# Patient Record
Sex: FEMALE | Race: WHITE | Employment: OTHER | URBAN - METROPOLITAN AREA
[De-identification: names, ages, dates, MRNs, and addresses within clinical notes are randomized per-mention and may not be internally consistent; named-entity substitution may affect disease eponyms.]

---

## 2019-04-05 ENCOUNTER — APPOINTMENT (OUTPATIENT)
Dept: CT IMAGING | Age: 84
End: 2019-04-05
Payer: MEDICARE

## 2019-04-05 ENCOUNTER — HOSPITAL ENCOUNTER (OUTPATIENT)
Age: 84
Setting detail: OBSERVATION
Discharge: NURSING, REHAB OR LONG-TERM CARE | End: 2019-04-07
Attending: EMERGENCY MEDICINE | Admitting: INTERNAL MEDICINE
Payer: MEDICARE

## 2019-04-05 ENCOUNTER — APPOINTMENT (OUTPATIENT)
Dept: GENERAL RADIOLOGY | Age: 84
End: 2019-04-05
Payer: MEDICARE

## 2019-04-05 DIAGNOSIS — G45.9 TIA (TRANSIENT ISCHEMIC ATTACK): Primary | ICD-10-CM

## 2019-04-05 LAB
A/G RATIO: 1 (ref 1.1–2.2)
ALBUMIN SERPL-MCNC: 3.8 G/DL (ref 3.4–5)
ALP BLD-CCNC: 177 U/L (ref 40–129)
ALT SERPL-CCNC: 13 U/L (ref 10–40)
ANION GAP SERPL CALCULATED.3IONS-SCNC: 13 MMOL/L (ref 3–16)
AST SERPL-CCNC: 22 U/L (ref 15–37)
BASOPHILS ABSOLUTE: 0.2 K/UL (ref 0–0.2)
BASOPHILS RELATIVE PERCENT: 3 %
BILIRUB SERPL-MCNC: <0.2 MG/DL (ref 0–1)
BUN BLDV-MCNC: 25 MG/DL (ref 7–20)
CALCIUM SERPL-MCNC: 9.3 MG/DL (ref 8.3–10.6)
CHLORIDE BLD-SCNC: 105 MMOL/L (ref 99–110)
CO2: 24 MMOL/L (ref 21–32)
CREAT SERPL-MCNC: 0.9 MG/DL (ref 0.6–1.2)
EOSINOPHILS ABSOLUTE: 0.3 K/UL (ref 0–0.6)
EOSINOPHILS RELATIVE PERCENT: 3.3 %
GFR AFRICAN AMERICAN: >60
GFR NON-AFRICAN AMERICAN: 59
GLOBULIN: 3.8 G/DL
GLUCOSE BLD-MCNC: 91 MG/DL (ref 70–99)
HCT VFR BLD CALC: 43 % (ref 36–48)
HEMOGLOBIN: 14 G/DL (ref 12–16)
INR BLD: 1.14 (ref 0.86–1.14)
LYMPHOCYTES ABSOLUTE: 2.1 K/UL (ref 1–5.1)
LYMPHOCYTES RELATIVE PERCENT: 27.3 %
MCH RBC QN AUTO: 28 PG (ref 26–34)
MCHC RBC AUTO-ENTMCNC: 32.6 G/DL (ref 31–36)
MCV RBC AUTO: 85.8 FL (ref 80–100)
MONOCYTES ABSOLUTE: 0.6 K/UL (ref 0–1.3)
MONOCYTES RELATIVE PERCENT: 8.1 %
NEUTROPHILS ABSOLUTE: 4.6 K/UL (ref 1.7–7.7)
NEUTROPHILS RELATIVE PERCENT: 58.3 %
PDW BLD-RTO: 16.1 % (ref 12.4–15.4)
PLATELET # BLD: 387 K/UL (ref 135–450)
PMV BLD AUTO: 8.2 FL (ref 5–10.5)
POTASSIUM SERPL-SCNC: 5.1 MMOL/L (ref 3.5–5.1)
PRO-BNP: 327 PG/ML (ref 0–449)
PROTHROMBIN TIME: 13 SEC (ref 9.8–13)
RBC # BLD: 5.01 M/UL (ref 4–5.2)
SODIUM BLD-SCNC: 142 MMOL/L (ref 136–145)
TOTAL PROTEIN: 7.6 G/DL (ref 6.4–8.2)
TROPONIN: <0.01 NG/ML
WBC # BLD: 7.9 K/UL (ref 4–11)

## 2019-04-05 PROCEDURE — 80053 COMPREHEN METABOLIC PANEL: CPT

## 2019-04-05 PROCEDURE — 6360000004 HC RX CONTRAST MEDICATION: Performed by: EMERGENCY MEDICINE

## 2019-04-05 PROCEDURE — 70498 CT ANGIOGRAPHY NECK: CPT

## 2019-04-05 PROCEDURE — 93005 ELECTROCARDIOGRAM TRACING: CPT | Performed by: EMERGENCY MEDICINE

## 2019-04-05 PROCEDURE — 85610 PROTHROMBIN TIME: CPT

## 2019-04-05 PROCEDURE — 71045 X-RAY EXAM CHEST 1 VIEW: CPT

## 2019-04-05 PROCEDURE — G0378 HOSPITAL OBSERVATION PER HR: HCPCS

## 2019-04-05 PROCEDURE — 83880 ASSAY OF NATRIURETIC PEPTIDE: CPT

## 2019-04-05 PROCEDURE — 99285 EMERGENCY DEPT VISIT HI MDM: CPT

## 2019-04-05 PROCEDURE — 70496 CT ANGIOGRAPHY HEAD: CPT

## 2019-04-05 PROCEDURE — 70450 CT HEAD/BRAIN W/O DYE: CPT

## 2019-04-05 PROCEDURE — 85025 COMPLETE CBC W/AUTO DIFF WBC: CPT

## 2019-04-05 PROCEDURE — 84484 ASSAY OF TROPONIN QUANT: CPT

## 2019-04-05 RX ORDER — LOPERAMIDE HYDROCHLORIDE 2 MG/1
2 CAPSULE ORAL 4 TIMES DAILY PRN
COMMUNITY

## 2019-04-05 RX ORDER — M-VIT,TX,IRON,MINS/CALC/FOLIC 27MG-0.4MG
1 TABLET ORAL DAILY
Status: DISCONTINUED | OUTPATIENT
Start: 2019-04-06 | End: 2019-04-07 | Stop reason: HOSPADM

## 2019-04-05 RX ORDER — SODIUM CHLORIDE 0.9 % (FLUSH) 0.9 %
10 SYRINGE (ML) INJECTION PRN
Status: DISCONTINUED | OUTPATIENT
Start: 2019-04-05 | End: 2019-04-07 | Stop reason: HOSPADM

## 2019-04-05 RX ORDER — BISACODYL 10 MG
10 SUPPOSITORY, RECTAL RECTAL DAILY
Status: DISCONTINUED | OUTPATIENT
Start: 2019-04-06 | End: 2019-04-07 | Stop reason: HOSPADM

## 2019-04-05 RX ORDER — LANOLIN ALCOHOL/MO/W.PET/CERES
3 CREAM (GRAM) TOPICAL NIGHTLY
Status: DISCONTINUED | OUTPATIENT
Start: 2019-04-05 | End: 2019-04-07 | Stop reason: HOSPADM

## 2019-04-05 RX ORDER — FLUTICASONE PROPIONATE 50 MCG
2 SPRAY, SUSPENSION (ML) NASAL DAILY
Status: DISCONTINUED | OUTPATIENT
Start: 2019-04-06 | End: 2019-04-07 | Stop reason: HOSPADM

## 2019-04-05 RX ORDER — ONDANSETRON 2 MG/ML
4 INJECTION INTRAMUSCULAR; INTRAVENOUS EVERY 6 HOURS PRN
Status: DISCONTINUED | OUTPATIENT
Start: 2019-04-05 | End: 2019-04-07 | Stop reason: HOSPADM

## 2019-04-05 RX ORDER — LOPERAMIDE HYDROCHLORIDE 2 MG/1
2 CAPSULE ORAL 4 TIMES DAILY PRN
Status: DISCONTINUED | OUTPATIENT
Start: 2019-04-05 | End: 2019-04-07 | Stop reason: HOSPADM

## 2019-04-05 RX ORDER — ACETAMINOPHEN 325 MG/1
650 TABLET ORAL EVERY 4 HOURS PRN
Status: DISCONTINUED | OUTPATIENT
Start: 2019-04-05 | End: 2019-04-07 | Stop reason: HOSPADM

## 2019-04-05 RX ORDER — ATORVASTATIN CALCIUM 40 MG/1
40 TABLET, FILM COATED ORAL NIGHTLY
Status: DISCONTINUED | OUTPATIENT
Start: 2019-04-05 | End: 2019-04-05 | Stop reason: ALTCHOICE

## 2019-04-05 RX ORDER — ACETAMINOPHEN 500 MG
1000 TABLET ORAL EVERY 6 HOURS PRN
Status: DISCONTINUED | OUTPATIENT
Start: 2019-04-05 | End: 2019-04-07 | Stop reason: HOSPADM

## 2019-04-05 RX ORDER — BISACODYL 10 MG
10 SUPPOSITORY, RECTAL RECTAL DAILY
COMMUNITY

## 2019-04-05 RX ORDER — POLYETHYLENE GLYCOL 3350 17 G/17G
17 POWDER, FOR SOLUTION ORAL DAILY PRN
Status: DISCONTINUED | OUTPATIENT
Start: 2019-04-05 | End: 2019-04-07 | Stop reason: HOSPADM

## 2019-04-05 RX ORDER — POLYETHYLENE GLYCOL 3350 17 G/17G
17 POWDER, FOR SOLUTION ORAL DAILY
Status: DISCONTINUED | OUTPATIENT
Start: 2019-04-06 | End: 2019-04-07 | Stop reason: HOSPADM

## 2019-04-05 RX ORDER — ATORVASTATIN CALCIUM 10 MG/1
10 TABLET, FILM COATED ORAL NIGHTLY
Status: DISCONTINUED | OUTPATIENT
Start: 2019-04-05 | End: 2019-04-07 | Stop reason: HOSPADM

## 2019-04-05 RX ORDER — MIRTAZAPINE 15 MG/1
7.5 TABLET, FILM COATED ORAL NIGHTLY
COMMUNITY

## 2019-04-05 RX ORDER — ATORVASTATIN CALCIUM 10 MG/1
10 TABLET, FILM COATED ORAL DAILY
COMMUNITY

## 2019-04-05 RX ORDER — LABETALOL HYDROCHLORIDE 5 MG/ML
10 INJECTION, SOLUTION INTRAVENOUS EVERY 10 MIN PRN
Status: DISCONTINUED | OUTPATIENT
Start: 2019-04-05 | End: 2019-04-07 | Stop reason: HOSPADM

## 2019-04-05 RX ORDER — POLYETHYLENE GLYCOL 3350 17 G/17G
17 POWDER, FOR SOLUTION ORAL DAILY
COMMUNITY

## 2019-04-05 RX ORDER — MIRTAZAPINE 15 MG/1
7.5 TABLET, FILM COATED ORAL NIGHTLY
Status: DISCONTINUED | OUTPATIENT
Start: 2019-04-05 | End: 2019-04-07 | Stop reason: HOSPADM

## 2019-04-05 RX ORDER — SODIUM CHLORIDE 0.9 % (FLUSH) 0.9 %
10 SYRINGE (ML) INJECTION EVERY 12 HOURS SCHEDULED
Status: DISCONTINUED | OUTPATIENT
Start: 2019-04-05 | End: 2019-04-07 | Stop reason: HOSPADM

## 2019-04-05 RX ORDER — ASPIRIN 81 MG/1
81 TABLET ORAL DAILY
Status: DISCONTINUED | OUTPATIENT
Start: 2019-04-06 | End: 2019-04-07 | Stop reason: HOSPADM

## 2019-04-05 RX ORDER — SENNA AND DOCUSATE SODIUM 50; 8.6 MG/1; MG/1
1 TABLET, FILM COATED ORAL DAILY
Status: DISCONTINUED | OUTPATIENT
Start: 2019-04-06 | End: 2019-04-07 | Stop reason: HOSPADM

## 2019-04-05 RX ORDER — SENNA AND DOCUSATE SODIUM 50; 8.6 MG/1; MG/1
1 TABLET, FILM COATED ORAL DAILY
COMMUNITY

## 2019-04-05 RX ADMIN — IOPAMIDOL 75 ML: 755 INJECTION, SOLUTION INTRAVENOUS at 19:13

## 2019-04-05 ASSESSMENT — PAIN SCALES - GENERAL: PAINLEVEL_OUTOF10: 0

## 2019-04-05 NOTE — ED PROVIDER NOTES
[facility] Emergency Department    CHIEF COMPLAINT  Chief Complaint   Patient presents with    Fatigue     Patient arrived via squad for c/o stroke like symptoms. HISTORY OF PRESENT ILLNESS  Aayush Elliott is a 80 y.o. female  who presents to the ED complaining of concern for LKW at 5pm with facial droop and L sided weakness. EMS states daughter had pt sent in for evaluation but we cannot otherwise confirm the LKW time - calls to nursing home later indicate that the nurses at the nursing home [facility]) prompted the transfer and not the family. She has a history of Alzheimer's disease, and documented history of intracerebral hemorrhage, though history details from this are not available at this time. Patient states to me \"I want my father. [address] name. I'm only here because they got stuck. \"  She is not a reliable historian at baseline but does make eye contact. On arrival with EMS I did notice a subtle facial droop and L sided weakness. After her stroke-alert CT, I evaluated her with a full NIHSS (see charted documentation). Sx seemed to have improved. No other complaints, modifying factors or associated symptoms. I have reviewed the following from the nursing documentation. Past Medical History:   Diagnosis Date    Alzheimer disease     IBS (irritable bowel syndrome)     Urinary incontinence      History reviewed. No pertinent surgical history. History reviewed. No pertinent family history. Social History     Socioeconomic History    Marital status:       Spouse name: Not on file    Number of children: Not on file    Years of education: Not on file    Highest education level: Not on file   Occupational History    Not on file   Social Needs    Financial resource strain: Not on file    Food insecurity:     Worry: Not on file     Inability: Not on file    Transportation needs:     Medical: Not on file     Non-medical: Not on file   Tobacco Use    Smoking status: Never Smoker   Substance and Sexual Activity    Alcohol use: No    Drug use: No    Sexual activity: Not on file   Lifestyle    Physical activity:     Days per week: Not on file     Minutes per session: Not on file    Stress: Not on file   Relationships    Social connections:     Talks on phone: Not on file     Gets together: Not on file     Attends Restorationist service: Not on file     Active member of club or organization: Not on file     Attends meetings of clubs or organizations: Not on file     Relationship status: Not on file    Intimate partner violence:     Fear of current or ex partner: Not on file     Emotionally abused: Not on file     Physically abused: Not on file     Forced sexual activity: Not on file   Other Topics Concern    Not on file   Social History Narrative    Not on file     No current facility-administered medications for this encounter. Current Outpatient Medications   Medication Sig Dispense Refill    atorvastatin (LIPITOR) 10 MG tablet Take 10 mg by mouth daily      tretinoin (RETIN-A) 0.025 % cream Apply topically nightly Apply topically nightly.  loperamide (IMODIUM) 2 MG capsule Take 2 mg by mouth 4 times daily as needed for Diarrhea      bisacodyl (DULCOLAX) 10 MG suppository Place 10 mg rectally daily      sennosides-docusate sodium (SENOKOT-S) 8.6-50 MG tablet Take 1 tablet by mouth daily      polyethylene glycol (MIRALAX) powder Take 17 g by mouth daily      mirtazapine (REMERON) 15 MG tablet Take 7.5 mg by mouth nightly      fluticasone (FLONASE) 50 MCG/ACT nasal spray 2 sprays by Nasal route daily.  therapeutic multivitamin-minerals (THERAGRAN-M) tablet Take 1 tablet by mouth daily.  melatonin 3 MG TABS Take 3 mg by mouth nightly.  acetaminophen (TYLENOL) 500 MG tablet Take 1,000 mg by mouth every 6 hours as needed.  aspirin 81 MG tablet Take 81 mg by mouth daily.       Calcium Carbonate-Vitamin D (CALCIUM 600 + D PO) Take hospital encounter of 04/05/19   CBC auto differential   Result Value Ref Range    WBC 7.9 4.0 - 11.0 K/uL    RBC 5.01 4.00 - 5.20 M/uL    Hemoglobin 14.0 12.0 - 16.0 g/dL    Hematocrit 43.0 36.0 - 48.0 %    MCV 85.8 80.0 - 100.0 fL    MCH 28.0 26.0 - 34.0 pg    MCHC 32.6 31.0 - 36.0 g/dL    RDW 16.1 (H) 12.4 - 15.4 %    Platelets 115 428 - 972 K/uL    MPV 8.2 5.0 - 10.5 fL    Neutrophils % 58.3 %    Lymphocytes % 27.3 %    Monocytes % 8.1 %    Eosinophils % 3.3 %    Basophils % 3.0 %    Neutrophils # 4.6 1.7 - 7.7 K/uL    Lymphocytes # 2.1 1.0 - 5.1 K/uL    Monocytes # 0.6 0.0 - 1.3 K/uL    Eosinophils # 0.3 0.0 - 0.6 K/uL    Basophils # 0.2 0.0 - 0.2 K/uL   Comprehensive metabolic panel   Result Value Ref Range    Sodium 142 136 - 145 mmol/L    Potassium 5.1 3.5 - 5.1 mmol/L    Chloride 105 99 - 110 mmol/L    CO2 24 21 - 32 mmol/L    Anion Gap 13 3 - 16    Glucose 91 70 - 99 mg/dL    BUN 25 (H) 7 - 20 mg/dL    CREATININE 0.9 0.6 - 1.2 mg/dL    GFR Non- 59 (A) >60    GFR African American >60 >60    Calcium 9.3 8.3 - 10.6 mg/dL    Total Protein 7.6 6.4 - 8.2 g/dL    Alb 3.8 3.4 - 5.0 g/dL    Albumin/Globulin Ratio 1.0 (L) 1.1 - 2.2    Total Bilirubin <0.2 0.0 - 1.0 mg/dL    Alkaline Phosphatase 177 (H) 40 - 129 U/L    ALT 13 10 - 40 U/L    AST 22 15 - 37 U/L    Globulin 3.8 g/dL   Protime-INR   Result Value Ref Range    Protime 13.0 9.8 - 13.0 sec    INR 1.14 0.86 - 1.14   Troponin   Result Value Ref Range    Troponin <0.01 <0.01 ng/mL   Brain Natriuretic Peptide   Result Value Ref Range    Pro- 0 - 449 pg/mL   EKG 12 Lead   Result Value Ref Range    Ventricular Rate 91 BPM    Atrial Rate 91 BPM    P-R Interval 178 ms    QRS Duration 80 ms    Q-T Interval 370 ms    QTc Calculation (Bazett) 455 ms    P Axis 48 degrees    R Axis -20 degrees    T Axis 47 degrees    Diagnosis Normal sinus rhythmNormal ECG      The 12 lead EKG was interpreted by me as follows:  Rate: normal with a rate of THE CHEST 4/5/2019 7:06 pm COMPARISON: None. HISTORY: ORDERING SYSTEM PROVIDED HISTORY: stroke alert TECHNOLOGIST PROVIDED HISTORY: Reason for exam:->stroke alert Ordering Physician Provided Reason for Exam: stroke alert Acuity: Unknown Type of Exam: Unknown FINDINGS: Heart, mediastinum and pulmonary vascularity are normal.  Interstitial changes and calcified granulomata are observed in the right greater than left lung. No acute airspace abnormality. No significant skeletal findings. Chronic interstitial changes in the chest with no acute finding. Cta Neck W Contrast    Result Date: 4/5/2019  EXAMINATION: CTA OF THE NECK; CTA OF THE HEAD WITH CONTRAST 4/5/2019 7:06 pm; 4/5/2019 7:05 pm: TECHNIQUE: CTA of the neck was performed with the administration of intravenous contrast. Multiplanar reformatted images are provided for review. MIP images are provided for review. Stenosis of the internal carotid arteries measured using NASCET criteria. Dose modulation, iterative reconstruction, and/or weight based adjustment of the mA/kV was utilized to reduce the radiation dose to as low as reasonably achievable.; CTA of the head/brain was performed with the administration of intravenous contrast. Multiplanar reformatted images are provided for review. MIP images are provided for review. Dose modulation, iterative reconstruction, and/or weight based adjustment of the mA/kV was utilized to reduce the radiation dose to as low as reasonably achievable. COMPARISON: None. HISTORY: ORDERING SYSTEM PROVIDED HISTORY: cva alert TECHNOLOGIST PROVIDED HISTORY: Has a \"code stroke\" or \"stroke alert\" been called? ->No Ordering Physician Provided Reason for Exam: stroke alert Acuity: Acute Type of Exam: Initial; ORDERING SYSTEM PROVIDED HISTORY: cva alert TECHNOLOGIST PROVIDED HISTORY: Has a \"code stroke\" or \"stroke alert\" been called? ->No Ordering Physician Provided Reason for Exam: stoke alert Acuity: Acute Type of Exam: Initial FINDINGS: CTA NECK: AORTIC ARCH/ARCH VESSELS: The aortic arch has been excluded from CAROTID ARTERIES: The common carotid arteries are normal in appearance without evidence of a flow limiting stenosis. There is 50% stenosis of the proximal left cervical ICA due to calcified and noncalcified plaque. No flow limiting stenosis of the contralateral carotid is identified. No dissection or arterial injury is seen. VERTEBRAL ARTERIES: The vertebral arteries both arise from the subclavian arteries and are normal in caliber without evidence of flow limiting stenosis or dissection. SOFT TISSUES: The lung apices are clear. No cervical or superior mediastinal lymphadenopathy. The visualized portion of the larynx and pharynx appear unremarkable. The parotid, submandibular and thyroid glands demonstrate no acute abnormality. BONES: There are degenerative changes of the spine. CTA HEAD: ANTERIOR CIRCULATION: The internal carotid arteries are normal in course and caliber without focal stenosis. The anterior cerebral and middle cerebral arteries demonstrate no focal stenosis. POSTERIOR CIRCULATION: The posterior cerebral arteries demonstrate no focal stenosis. The vertebral and basilar arteries appear unremarkable. BRAIN: See separately dictated noncontrast head CT report. 50% stenosis of the proximal left cervical ICA. Otherwise, no flow limiting stenosis or branch occlusion detected within the head or neck. Cta Head W Contrast    Result Date: 4/5/2019  EXAMINATION: CTA OF THE NECK; CTA OF THE HEAD WITH CONTRAST 4/5/2019 7:06 pm; 4/5/2019 7:05 pm: TECHNIQUE: CTA of the neck was performed with the administration of intravenous contrast. Multiplanar reformatted images are provided for review. MIP images are provided for review. Stenosis of the internal carotid arteries measured using NASCET criteria.  Dose modulation, iterative reconstruction, and/or weight based adjustment of the mA/kV was utilized to reduce the radiation dose to as low as reasonably achievable.; CTA of the head/brain was performed with the administration of intravenous contrast. Multiplanar reformatted images are provided for review. MIP images are provided for review. Dose modulation, iterative reconstruction, and/or weight based adjustment of the mA/kV was utilized to reduce the radiation dose to as low as reasonably achievable. COMPARISON: None. HISTORY: ORDERING SYSTEM PROVIDED HISTORY: cva alert TECHNOLOGIST PROVIDED HISTORY: Has a \"code stroke\" or \"stroke alert\" been called? ->No Ordering Physician Provided Reason for Exam: stroke alert Acuity: Acute Type of Exam: Initial; ORDERING SYSTEM PROVIDED HISTORY: cva alert TECHNOLOGIST PROVIDED HISTORY: Has a \"code stroke\" or \"stroke alert\" been called? ->No Ordering Physician Provided Reason for Exam: stoke alert Acuity: Acute Type of Exam: Initial FINDINGS: CTA NECK: AORTIC ARCH/ARCH VESSELS: The aortic arch has been excluded from CAROTID ARTERIES: The common carotid arteries are normal in appearance without evidence of a flow limiting stenosis. There is 50% stenosis of the proximal left cervical ICA due to calcified and noncalcified plaque. No flow limiting stenosis of the contralateral carotid is identified. No dissection or arterial injury is seen. VERTEBRAL ARTERIES: The vertebral arteries both arise from the subclavian arteries and are normal in caliber without evidence of flow limiting stenosis or dissection. SOFT TISSUES: The lung apices are clear. No cervical or superior mediastinal lymphadenopathy. The visualized portion of the larynx and pharynx appear unremarkable. The parotid, submandibular and thyroid glands demonstrate no acute abnormality. BONES: There are degenerative changes of the spine. CTA HEAD: ANTERIOR CIRCULATION: The internal carotid arteries are normal in course and caliber without focal stenosis.  The anterior cerebral and middle cerebral arteries demonstrate no focal stenosis. POSTERIOR CIRCULATION: The posterior cerebral arteries demonstrate no focal stenosis. The vertebral and basilar arteries appear unremarkable. BRAIN: See separately dictated noncontrast head CT report. 50% stenosis of the proximal left cervical ICA. Otherwise, no flow limiting stenosis or branch occlusion detected within the head or neck. ED COURSE/MDM  Patient seen and evaluated. Old records reviewed. Labs and imaging reviewed and results discussed with patient. After initial evaluation, differential diagnostic considerations included: stroke, TIA, intracranial bleed, trauma, infection/sepsis, medication side effect, intoxication/withdrawal, metabolic/electrolyte abnormalities    The patient's ED workup was notable for concern for transient ischemic attack. The patient has a nonfocal exam now but did have a facial droop upon arrival by EMS, no longer present when I evaluated her immediately post CAT scan. She has no appreciable deficits in her upper extremities. She has weakness in both of the legs but it appears symmetric. Her history is unreliable. She has a history of significant dementia. Head CT and CT angiogram did not demonstrate any acute large vessel occlusion or acute ischemic change or hemorrhagic problems either. Basic labs are unremarkable. At this point there is no indication for TPA. Unfortunately family members are not at the bedside and unable to be reached by phone at this point and so we will admit to the hospital for further treatment and evaluation of her likely transient ischemic event. I consulted and spoke with Dr. Stanford Hardwick from [facility] stroke team about the patient's ED history, physical, workup, and course so far. Recommendations from this consultant included agreement with no tPA.     During the patient's ED course, the patient was given:  Medications   iopamidol (ISOVUE-370) 76 % injection 75 mL (75 mLs Intravenous Given 4/5/19 1913)        CLINICAL IMPRESSION  1. TIA (transient ischemic attack)        Blood pressure (!) 150/83, pulse 91, temperature 98.1 °F (36.7 °C), temperature source Oral, resp. rate 23, height 5' 5\" (1.651 m), weight 154 lb 6.4 oz (70 kg), SpO2 100 %. [address] Lanette Mccann was admitted in fair condition. I have discussed the findings of today's workup with the patient and addressed the patient's questions and concerns. The plan is to admit to the hospital at this time under the PCP's service. I spoke with Dr. Ivan Hernández who accepted the patient and will take over the patient's care. The patient is agreeable with this plan. The total critical care time spent while evaluating and treating this patient was at least 40 minutes. This excludes time spent doing separately billable procedures. This includes time at the bedside, data interpretation, medication management, obtaining critical history from collateral sources if the patient is unable to provide it directly, and physician consultation. Specifics of interventions taken and potentially life-threatening diagnostic considerations are listed above in the medical decision making. DISCLAIMER: This chart was created using Dragon dictation software. Efforts were made by me to ensure accuracy, however some errors may be present due to limitations of this technology and occasionally words are not transcribed correctly.         Brigido Perez MD  04/05/19 2027       Brigido Perez MD  04/05/19 2106

## 2019-04-05 NOTE — ED NOTES
Bed: 09  Expected date:   Expected time:   Means of arrival:   Comments:  [address] Wilfredoe Angelina, RN  04/05/19 0305

## 2019-04-06 ENCOUNTER — APPOINTMENT (OUTPATIENT)
Dept: MRI IMAGING | Age: 84
End: 2019-04-06
Payer: MEDICARE

## 2019-04-06 PROBLEM — R47.1 DYSARTHRIA: Status: ACTIVE | Noted: 2019-04-06

## 2019-04-06 PROBLEM — I25.10 ASHD (ARTERIOSCLEROTIC HEART DISEASE): Status: ACTIVE | Noted: 2019-04-06

## 2019-04-06 PROBLEM — I16.0 HYPERTENSIVE URGENCY: Status: ACTIVE | Noted: 2019-04-06

## 2019-04-06 LAB
ANION GAP SERPL CALCULATED.3IONS-SCNC: 14 MMOL/L (ref 3–16)
BUN BLDV-MCNC: 22 MG/DL (ref 7–20)
CALCIUM SERPL-MCNC: 9 MG/DL (ref 8.3–10.6)
CHLORIDE BLD-SCNC: 107 MMOL/L (ref 99–110)
CHOLESTEROL, TOTAL: 146 MG/DL (ref 0–199)
CO2: 23 MMOL/L (ref 21–32)
CREAT SERPL-MCNC: 0.9 MG/DL (ref 0.6–1.2)
EKG ATRIAL RATE: 91 BPM
EKG DIAGNOSIS: NORMAL
EKG P AXIS: 48 DEGREES
EKG P-R INTERVAL: 178 MS
EKG Q-T INTERVAL: 370 MS
EKG QRS DURATION: 80 MS
EKG QTC CALCULATION (BAZETT): 455 MS
EKG R AXIS: -20 DEGREES
EKG T AXIS: 47 DEGREES
EKG VENTRICULAR RATE: 91 BPM
GFR AFRICAN AMERICAN: >60
GFR NON-AFRICAN AMERICAN: 59
GLUCOSE BLD-MCNC: 110 MG/DL (ref 70–99)
HCT VFR BLD CALC: 39.6 % (ref 36–48)
HDLC SERPL-MCNC: 45 MG/DL (ref 40–60)
HEMOGLOBIN: 13.1 G/DL (ref 12–16)
LDL CHOLESTEROL CALCULATED: 81 MG/DL
LEFT VENTRICULAR EJECTION FRACTION MODE: NORMAL
MCH RBC QN AUTO: 28.2 PG (ref 26–34)
MCHC RBC AUTO-ENTMCNC: 33 G/DL (ref 31–36)
MCV RBC AUTO: 85.7 FL (ref 80–100)
PDW BLD-RTO: 15.9 % (ref 12.4–15.4)
PLATELET # BLD: 353 K/UL (ref 135–450)
PMV BLD AUTO: 8.4 FL (ref 5–10.5)
POTASSIUM REFLEX MAGNESIUM: 4.4 MMOL/L (ref 3.5–5.1)
RBC # BLD: 4.63 M/UL (ref 4–5.2)
SODIUM BLD-SCNC: 144 MMOL/L (ref 136–145)
TRIGL SERPL-MCNC: 102 MG/DL (ref 0–150)
VLDLC SERPL CALC-MCNC: 20 MG/DL
WBC # BLD: 8.2 K/UL (ref 4–11)

## 2019-04-06 PROCEDURE — 36415 COLL VENOUS BLD VENIPUNCTURE: CPT

## 2019-04-06 PROCEDURE — G0378 HOSPITAL OBSERVATION PER HR: HCPCS

## 2019-04-06 PROCEDURE — 85027 COMPLETE CBC AUTOMATED: CPT

## 2019-04-06 PROCEDURE — 80048 BASIC METABOLIC PNL TOTAL CA: CPT

## 2019-04-06 PROCEDURE — 92526 ORAL FUNCTION THERAPY: CPT

## 2019-04-06 PROCEDURE — 80061 LIPID PANEL: CPT

## 2019-04-06 PROCEDURE — 93010 ELECTROCARDIOGRAM REPORT: CPT | Performed by: INTERNAL MEDICINE

## 2019-04-06 PROCEDURE — 6360000002 HC RX W HCPCS: Performed by: INTERNAL MEDICINE

## 2019-04-06 PROCEDURE — 6370000000 HC RX 637 (ALT 250 FOR IP): Performed by: INTERNAL MEDICINE

## 2019-04-06 PROCEDURE — 97166 OT EVAL MOD COMPLEX 45 MIN: CPT

## 2019-04-06 PROCEDURE — 93306 TTE W/DOPPLER COMPLETE: CPT

## 2019-04-06 PROCEDURE — 96372 THER/PROPH/DIAG INJ SC/IM: CPT

## 2019-04-06 PROCEDURE — 2580000003 HC RX 258: Performed by: INTERNAL MEDICINE

## 2019-04-06 PROCEDURE — 70551 MRI BRAIN STEM W/O DYE: CPT

## 2019-04-06 PROCEDURE — 92610 EVALUATE SWALLOWING FUNCTION: CPT

## 2019-04-06 PROCEDURE — 97535 SELF CARE MNGMENT TRAINING: CPT

## 2019-04-06 RX ORDER — ACETAMINOPHEN 80 MG
TABLET,CHEWABLE ORAL
Status: COMPLETED
Start: 2019-04-06 | End: 2019-04-06

## 2019-04-06 RX ORDER — LABETALOL 200 MG/1
100 TABLET, FILM COATED ORAL EVERY 12 HOURS SCHEDULED
Status: DISCONTINUED | OUTPATIENT
Start: 2019-04-06 | End: 2019-04-07

## 2019-04-06 RX ADMIN — ATORVASTATIN CALCIUM 10 MG: 10 TABLET, FILM COATED ORAL at 20:23

## 2019-04-06 RX ADMIN — Medication 10 ML: at 02:35

## 2019-04-06 RX ADMIN — MIRTAZAPINE 7.5 MG: 15 TABLET, FILM COATED ORAL at 20:23

## 2019-04-06 RX ADMIN — Medication 10 ML: at 10:12

## 2019-04-06 RX ADMIN — LABETALOL HCL 100 MG: 200 TABLET, FILM COATED ORAL at 20:58

## 2019-04-06 RX ADMIN — ENOXAPARIN SODIUM 40 MG: 40 INJECTION SUBCUTANEOUS at 10:12

## 2019-04-06 RX ADMIN — MELATONIN TAB 3 MG 3 MG: 3 TAB at 20:23

## 2019-04-06 RX ADMIN — Medication: at 21:02

## 2019-04-06 ASSESSMENT — PAIN SCALES - GENERAL
PAINLEVEL_OUTOF10: 0

## 2019-04-06 NOTE — ED NOTES
Bedside handoff completed with Leticia Meier RN. Patient transported to  via stretcher.       Silvestre Lorenzo RN  04/05/19 2175

## 2019-04-06 NOTE — PROGRESS NOTES
Physical Therapy  Attempted to see pt. Immediately after entering the room, RN came in stating that pt is leaving for brain MRI now. RN states that pt was not very responsive this AM and did not answer questions. Pt is much more alert now. RN feels pt may be suffering from severe sundowning. 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 Will attempt PT eval tomorrow.   Thank you for this referral.  Dianelys Tellez, MPT [id number]

## 2019-04-06 NOTE — ED NOTES
Spoke with patient's daughter, Gloria. Made up to date on plan of care. Denies questions at this time. Please update Gloria if anything changes.       Quincy Pereira RN  04/05/19 1430

## 2019-04-06 NOTE — PROGRESS NOTES
NIHSS completed at bedside in ED w/ Jayla Loya RN who agrees with NIHSS score and states unchanged assessment from prior assessment, including BLE weakness and ataxia.

## 2019-04-06 NOTE — PLAN OF CARE
Problem: Risk for Impaired Skin Integrity  Goal: Tissue integrity - skin and mucous membranes  Description  Structural intactness and normal physiological function of skin and  mucous membranes. Outcome: Ongoing  Note:   Skin grossly intact; pt incontinent - personal care performed; pt repositions self in bed for comfort; demonstrates no retention of instructions and fails to follow commands     Problem: Falls - Risk of:  Goal: Will remain free from falls  Description  Will remain free from falls  4/6/2019 0752 by August Pierson RN  Outcome: Ongoing  Note:   Call light in reach; bed in low position; SR up x2; pt displays no awareness or understanding of need to call for and await assist with OOB mobility. ; alarm active  4/6/2019 0243 by Gera Lopez RN  Outcome: Ongoing     Problem: Confusion - Acute:  Goal: Absence of continued neurological deterioration signs and symptoms  Description  Absence of continued neurological deterioration signs and symptoms  4/6/2019 0752 by August Piersno RN  Outcome: Ongoing  Note:   Alert with any verbal or tactile stim, but demonstrates no orientation to place, time, situation, and fails to follow commands  4/6/2019 0243 by Gera Lopez RN  Outcome: Ongoing     Problem: Discharge Planning:  Goal: Ability to perform activities of daily living will improve  Description  Ability to perform activities of daily living will improve  Outcome: Ongoing  Note:   Antic[facility]ate return to [facility] at discharge     Problem: Injury - Risk of, Physical Injury:  Goal: Will remain free from falls  Description  Will remain free from falls  4/6/2019 0752 by August Pierson RN  Outcome: Ongoing  Note:   Call light in reach; bed in low position; SR up x2; pt displays no awareness or understanding of need to call for and await assist with OOB mobility. ; alarm active  4/6/2019 0243 by Gera Lopez RN  Outcome: Ongoing     Problem: Mood - Altered:  Goal: Mood stable  Description  Mood stable  4/6/2019 0752 by Jyoti Cintron RN  Outcome: Ongoing  Note:   Pt physically resistant to personal care; yells at times, especially in requesting \"water\" = remains NPO pending SLP eval  4/6/2019 0243 by Andra Paz RN  Outcome: Ongoing

## 2019-04-06 NOTE — PROGRESS NOTES
Occupational Therapy   Occupational Therapy Initial Assessment/Discharge Summary  Date: 2019   Patient Name: Helen Kirk  MRN: [id number]     : 1930    Date of Service: 2019    Discharge Recommendations:    Helen Kirk scored a 14/24 on the [facility] ADL Inpatient form. At this time, no further OT is recommended upon discharge due to pt at baseline level of function and receiving assistance w/ ADLs, IADLs, and functional transfers at nursing home prior to admission. Recommend patient returns to prior setting with prior services. OT Equipment Recommendations  Equipment Needed: No    Assessment   Assessment: Pt appears near baseline and requires max A w/ ADLs. No further skilled OT needed at this time. Decision Making: Medium Complexity  Patient Education: Role of OT, OT eval, D/C recommendations, ADLs, bed mobility  Barriers to Learning: Cognition  No Skilled OT: At baseline function  REQUIRES OT FOLLOW UP: No  Activity Tolerance  Activity Tolerance: Treatment limited secondary to decreased cognition  Safety Devices  Safety Devices in place: Yes  Type of devices: All fall risk precautions in place; Bed alarm in place;Call light within reach; Positioning belt;Left in bed;Nurse notified           Patient Diagnosis(es): The encounter diagnosis was TIA (transient ischemic attack). has a past medical history of Alzheimer disease, IBS (irritable bowel syndrome), and Urinary incontinence. has no past surgical history on file. Restrictions  Restrictions/Precautions  Restrictions/Precautions: Fall Risk(high fall risk)  Position Activity Restriction  Other position/activity restrictions: Pt admitted from [address] [place] [address]. Pt poor historian. Subjective   General  Chart Reviewed:  Yes  Additional Pertinent Hx: Alzheimer disease, IBS, UTI  Family / Caregiver Present: No  Diagnosis: TIA  Subjective  Subjective: Pt supine in bed upon arrival, agreeable to OT eval.  Pain Assessment  Pain Assessment: Faces  Pain Level: 0  Patient's Stated Pain Goal: No pain     Social/Functional History  Social/Functional History  Lives With: Alone  Type of Home: Facility([facility])  Receives Help From: Other (comment)(receives assistance at [facility])  ADL Assistance: Needs assistance  Homemaking Assistance: Needs assistance  Homemaking Responsibilities: No  Ambulation Assistance: Needs assistance  Transfer Assistance: Needs assistance  Active : No  Additional Comments: Pt poor historian d/t Alzheimers. Per RN, pt bedridden and received a lot of assistance at [facility]. Objective   Vision: Impaired  Vision Exceptions: Wears glasses for reading  Hearing: Exceptions to [facility]  Hearing Exceptions: Hard of hearing/hearing concerns    Orientation  Overall Orientation Status: Impaired  Orientation Level: Oriented to person;Oriented to place; Disoriented to situation;Disoriented to time     Balance  Sitting Balance: Maximum assistance  Standing Balance: Unable to assess(comment)(unsafe to atand d/t poor sitting balance - will likely need assist of 2 or lift system for transfers)  ADL  Grooming: Setup;Verbal cueing  UE Bathing: Setup;Verbal cueing;Supervision  LE Bathing: Setup; Moderate assistance;Verbal cueing  UE Dressing: Setup;Maximum assistance(to don gown)  LE Dressing: Dependent/Total  Toileting: None  Additional Comments: Pt required assist w/ B lower legs and buttocks and total A to don B socks. Pt appears to be near baseline w/ ADL routine as she receives assistance at nursing home. Tone RUE  RUE Tone: Normotonic  Tone LUE  LUE Tone: Normotonic  Coordination  Movements Are Fluid And Coordinated: No  Coordination and Movement description: Left UE;Decreased speed;Decreased accuracy; Fine motor impairments;Gross motor impairments     Bed mobility  Supine to Sit: Maximum assistance  Sit to Supine: Maximum assistance  Transfers  Transfer Comments: no transfers performed d/t inability to stand  Vision - Basic Assessment  Prior Vision: Wears glasses only for reading  Patient Visual Report: No visual complaint reported. Cognition  Overall Cognitive Status: Exceptions  Following Commands: Follows one step commands with increased time; Follows one step commands with repetition  Attention Span: Attends with cues to redirect  Memory: Decreased recall of biographical Information;Decreased recall of precautions;Decreased recall of recent events;Decreased short term memory  Safety Judgement: Decreased awareness of need for assistance  Problem Solving: Assistance required to generate solutions  Insights: Not aware of deficits  Initiation: Requires cues for some  Sequencing: Requires cues for some  Cognition Comment: Pt w/ diagnosis of Alzheimers  Perception  Overall Perceptual Status: Impaired  Unilateral Attention: Cues to maintain midline in sitting  Initiation: Cues to initiate tasks              LUE AROM (degrees)  LUE AROM : Exceptions  LUE General AROM: shoulder limited to ~125 degrees; otherwise WFL  RUE AROM (degrees)  RUE AROM : WNL  LUE Strength  L Hand Grasp: 3/5  L Hand Release: 3/5  LUE Strength Comment: shoulder 3-/5; elbow and wrist 3+/5  RUE Strength  R Hand Grasp: 3+/5  R Hand Release: 3+/5  RUE Strength Comment: grossly 3+/5          AM-PAC Score        AM-[facility] Inpatient Daily Activity Raw Score: 14  AM-PAC Inpatient ADL T-Scale Score : 33.39  ADL Inpatient CMS 0-100% Score: 59.67  ADL Inpatient CMS G-Code Modifier : CK    Goals  Short term goals  Time Frame for Short term goals: No acute goals identified - pt appears baseline       Therapy Time   Individual Concurrent Group Co-treatment   Time In 1030         Time Out 1055         Minutes 25              Timed Code Treatment Minutes:   10    Total Treatment Minutes:  25-10 [address] [place] [address], [address] [place] [address]

## 2019-04-06 NOTE — PROGRESS NOTES
Instructed pt on plan for discharge to [facility] (transportation scheduled for 2030); no significant changes since earlier assessment, except as noted; no s/s of acute distress at present.

## 2019-04-06 NOTE — PROGRESS NOTES
CLINICAL BEDSIDE SWALLOWING EVALUATION  Speech Therapy Department    Patient Name:  Courtney Juarez  :  1930  Pain level:Denies when asked  Medical Diagnosis:   TIA (transient ischemic attack) [G45.9]  TIA (transient ischemic attack) [G45.9]     HPI: Sonalnabil Loaizasujey Monika Looney is a 80 y.o. female  who presents to the ED complaining of concern for LKW at 5pm with facial droop and L sided weakness. EMS states daughter had pt sent in for evaluation but we cannot otherwise confirm the LKW time - calls to nursing home later indicate that the nurses at the nursing home [facility]) prompted the transfer and not the family. She has a history of Alzheimer's disease, and documented history of intracerebral hemorrhage, though history details from this are not available at this time. Patient states to me \"I want my father. [address] name. I'm only here because they got stuck. \"  She is not a reliable historian at baseline but does make eye contact. On arrival with EMS I did notice a subtle facial droop and L sided weakness. After her stroke-alert CT, I evaluated her with a full NIHSS (see charted documentation). Sx seemed to have improved. \"     CXR 2019:  Impression   Chronic interstitial changes in the chest with no acute finding. Treatment Diagnosis:  Dysphagia    Impressions: Pt seen at bedside with consent of nursing. Pt is confused, oriented only to self. Hx of Alzheimer's. Nursing states that patient refused to swallow anything last night, prompting NPO status and speech consult. Pt pleasant and agreeable. Trialed with thin liquids via cup and straw, puree, mixed texture, and hard solids. Pt showed no overt clinical s/s of aspiration. No oral phase symptoms noted. Pt swallow appears grossly intact.     Dietary Recommendations: Regular diet, thin liquids, meds whole    Strategies: 90 degree positioning with all p.o. intake; small bites/sips; alternate textures through meal; reduce rate of intake Treatment/Goals: Speech therapy for dysphagia tx 3-5 times per week. ST.) Pt will tolerate recommended diet without s/s of aspiration   2.) If clinical symptoms of penetration/aspiration are noted,Pt will tolerate MBS to r/o aspiration and determine appropriate diet/liquid level. Oral motor Exam:  Dentition: WFL, partial top  Labial/Facial: WFL, ED notes indicate L facial droop, appears to have resolved  Lingual: WFL  Voice: WFL    Oral Phase:   WFL    Pharyngeal Phase:  WFL    Patient/Family Education:Education, results and recommendations given to the Pt and nurse, who verbalized understanding    Timed Code Treatment: 0    Total Treatment Time: 23    Discharge Recommendations: No Speech Therapy for Dysphagia treatment at discharge.     Daisha Cross [facility]  Speech Language Pathologist [phone]

## 2019-04-06 NOTE — PROGRESS NOTES
Occupational Therapy    Attempted to see pt for OT eval but pt currently NOY at [facility]. Will follow up with patient as schedule permits.     Thank you,  Trevor Reeves [place], [address]

## 2019-04-07 VITALS
HEART RATE: 85 BPM | BODY MASS INDEX: 25.56 KG/M2 | TEMPERATURE: 98.1 F | RESPIRATION RATE: 14 BRPM | WEIGHT: 153.4 LBS | DIASTOLIC BLOOD PRESSURE: 72 MMHG | SYSTOLIC BLOOD PRESSURE: 125 MMHG | OXYGEN SATURATION: 96 % | HEIGHT: 65 IN

## 2019-04-07 PROCEDURE — G0378 HOSPITAL OBSERVATION PER HR: HCPCS

## 2019-04-07 PROCEDURE — 6360000002 HC RX W HCPCS: Performed by: INTERNAL MEDICINE

## 2019-04-07 PROCEDURE — 96372 THER/PROPH/DIAG INJ SC/IM: CPT

## 2019-04-07 PROCEDURE — 6370000000 HC RX 637 (ALT 250 FOR IP): Performed by: INTERNAL MEDICINE

## 2019-04-07 RX ORDER — LABETALOL 100 MG/1
50 TABLET, FILM COATED ORAL EVERY 12 HOURS SCHEDULED
Qty: 60 TABLET | Refills: 3 | Status: SHIPPED | OUTPATIENT
Start: 2019-04-07

## 2019-04-07 RX ORDER — LABETALOL 200 MG/1
50 TABLET, FILM COATED ORAL EVERY 12 HOURS SCHEDULED
Status: DISCONTINUED | OUTPATIENT
Start: 2019-04-07 | End: 2019-04-07 | Stop reason: HOSPADM

## 2019-04-07 RX ADMIN — MULTIPLE VITAMINS W/ MINERALS TAB 1 TABLET: TAB at 08:03

## 2019-04-07 RX ADMIN — LABETALOL HCL 100 MG: 200 TABLET, FILM COATED ORAL at 08:02

## 2019-04-07 RX ADMIN — ENOXAPARIN SODIUM 40 MG: 40 INJECTION SUBCUTANEOUS at 08:02

## 2019-04-07 RX ADMIN — FLUTICASONE PROPIONATE 2 SPRAY: 50 SPRAY, METERED NASAL at 08:12

## 2019-04-07 RX ADMIN — SENNOSIDES AND DOCUSATE SODIUM 1 TABLET: 8.6; 5 TABLET ORAL at 08:02

## 2019-04-07 RX ADMIN — ASPIRIN 81 MG: 81 TABLET, COATED ORAL at 08:02

## 2019-04-07 RX ADMIN — LABETALOL HCL 50 MG: 200 TABLET, FILM COATED ORAL at 20:53

## 2019-04-07 RX ADMIN — BISACODYL 10 MG: 10 SUPPOSITORY RECTAL at 08:02

## 2019-04-07 RX ADMIN — ATORVASTATIN CALCIUM 10 MG: 10 TABLET, FILM COATED ORAL at 20:53

## 2019-04-07 RX ADMIN — MIRTAZAPINE 7.5 MG: 15 TABLET, FILM COATED ORAL at 20:53

## 2019-04-07 RX ADMIN — MELATONIN TAB 3 MG 3 MG: 3 TAB at 20:53

## 2019-04-07 RX ADMIN — ACETAMINOPHEN 650 MG: 325 TABLET, FILM COATED ORAL at 13:00

## 2019-04-07 RX ADMIN — POLYETHYLENE GLYCOL 3350 17 G: 17 POWDER, FOR SOLUTION ORAL at 08:02

## 2019-04-07 ASSESSMENT — PAIN SCALES - GENERAL
PAINLEVEL_OUTOF10: 0
PAINLEVEL_OUTOF10: 6
PAINLEVEL_OUTOF10: 0

## 2019-04-07 ASSESSMENT — PAIN DESCRIPTION - PAIN TYPE: TYPE: ACUTE PAIN

## 2019-04-07 ASSESSMENT — PAIN DESCRIPTION - DESCRIPTORS: DESCRIPTORS: HEADACHE

## 2019-04-07 ASSESSMENT — PAIN DESCRIPTION - LOCATION: LOCATION: HEAD

## 2019-04-07 NOTE — PLAN OF CARE
Problem: Tissue Perfusion - Cardiopulmonary, Altered:  Goal: Hemodynamic stability will improve  Description  Hemodynamic stability will improve  4/7/2019 0748 by Sherri Castillo RN  Outcome: Ongoing  Note:   /80 this AM - AM meds yet to be administered; HR, Temp, RR WNL; no edema evident; breath sounds diminished at bases but otherwise clear - minimal air movement at bases with weak DB effort after repeated cueing  4/7/2019 0121 by Chilo Mancia RN  Note:   Pt was kept for high BP in the 200's/100's this evening. Pt was given 100mg labetalol PO bid to help decrease the Bp. Since giving the labetalol pt BP have been much better. (135/98). Will continue to monitor and assess pt.       Problem: DISCHARGE BARRIERS  Goal: Patient's continuum of care needs are met  Outcome: Ongoing  Note:   Anticipate return to [facility] - planned discharge last evening held due to sudden increase in BP - appears controlled with current med regimen; awaiting Dr Mable Nicholas to evaluate for likely return to [facility] today

## 2019-04-07 NOTE — PROGRESS NOTES
SBP now 100, after earlier Labetolol dose; conferred by phone with pts's dtr/POA - she states [facility] physician stopped pt's antihypertensives @ 1 week ago - instructed her on current treatment regimen, and plan to return to [facility] with orders for BP meds; awaiting Dr Mark Lees for further discharge plan

## 2019-04-07 NOTE — PROGRESS NOTES
Patient Active Problem List   Diagnosis    TIA (transient ischemic attack)    Dysarthria    Hypertensive urgency    ASHD (arteriosclerotic heart disease)     H&P dictated

## 2019-04-07 NOTE — PROGRESS NOTES
Dr. Ramon Thompson called to notify of HTN and tachycardia. He would like pt to be held for observation overnight and cancel transportation for tonight. See new order for 100 labetalol BID.

## 2019-04-07 NOTE — PLAN OF CARE
Problem: Tissue Perfusion - Cardiopulmonary, Altered:  Goal: Hemodynamic stability will improve  Description  Hemodynamic stability will improve  Note:   Pt was kept for high BP in the 200's/100's this evening. Pt was given 100mg labetalol PO bid to help decrease the Bp. Since giving the labetalol pt BP have been much better. (135/98). Will continue to monitor and assess pt.

## 2019-04-07 NOTE — CARE COORDINATION
Discharge planning-    Per 3T staff, discharge was canceled last night- due to high blood pressure and tachycardia. Patient is expected to discharge today. Called [facility], spoke with Britany Ramires- Transport rescheduled for 3pm. Dr. Garza Roads notified via [address] [place] [address]. Number for RN report is [phone]. Plan- [facility], 3pm transport.     Will continue to follow for support and discharge planning.    -Luigi Garcia, MSW, LSW

## 2019-04-07 NOTE — H&P
[facility] [address]                     [facility], [address]                              HISTORY AND PHYSICAL    PATIENT NAME: Marina Hurtado                  :        1930  MED REC NO:   [id number]                          ROOM:       3366  ACCOUNT NO:   [de-identified]                           ADMIT DATE: 2019  PROVIDER:     Meng Michele MD    HISTORY OF PRESENT ILLNESS:  The patient is an 45-year-old white lady  from [facility] came to the emergency room with history of stroke-like  symptoms. She had slurring of speech and drooping of the angle of the  right side of the mouth. No specific motor weakness. No fall. No loss  of consciousness. No seizure activity. The patient herself is a very  poor historian, unable to give much history. PAST MEDICAL HISTORY:  Pertinent for Alzheimer's disease, urinary  incontinence, irritable bowel syndrome. PAST SURGICAL HISTORY:  Unremarkable. MEDICATIONS:  The patient is on _____, multivitamin, Senokot S, MiraLAX,  Remeron, melatonin, Imodium, Flonase, Dulcolax, Lipitor, Tylenol. ALLERGIES:  The patient is allergic to CEFACLOR, CIPROFLOXACIN, CLEOCIN,  LIDOCAINE GEL, PENICILLIN, SULFONAMIDE, TETRACYCLINE, and TRIMETHOPRIM. SOCIAL HISTORY:  She is a . She has two children. She quit  smoking many years ago. She used to work as a . She would  have occasional alcohol intake. No history of substance abuse. FAMILY HISTORY:  Both her parents are  of natural causes. No  further history available. REVIEW OF SYSTEMS:  Negative for loss of consciousness. No seizure  activity. No blurring of vision. There is some slurring of speech. There is some facial asymmetry. No angina pectoris. No orthopnea or  paroxysmal nocturnal dyspnea. No hematemesis or melena. No abdominal  pain. Frequently incontinent to urine. No other genitourinary  complaints.   Does have chronic musculoskeletal pain. PHYSICAL EXAMINATION:  GENERAL:  He is alert, awake, and oriented x0. Pleasantly lead,  confused 80-year-old white lady looking consistent with her stated age. VITAL SIGNS:  Her temperature is 98.1, blood pressure is 147/66 but it  goes as high as 205/104, respirations 18, heart rate 108. HEENT:  Oral mucosa dry. SKIN:  Warm and dry. NECK:  Supple. Faint carotid bruit. No jugular venous distention. No  lymphadenopathy. No thyromegaly. LUNGS:  Vesicular breath sounds. HEART:  Regular rate and rhythm. S1 and S2 without any S3 or S4 gallop. ABDOMEN:  Soft, nontender. Bowel sounds present. EXTREMITIES:  Trace edema. NEUROLOGIC:  The patient does have slight right-sided facial asymmetry  with drooping of the angle of the mouth on the right hand side. Babinski is absent. LABORATORY EVALUATION:  Showed sodium of 144, potassium 4.4, chloride  107, CO2 23, BUN 22, creatinine 0.9. Blood sugar 110, cholesterol 146,  LDL 81, triglyceride 102, white blood cell count is 8.2,  hemoglobin/hematocrit 13.1 and 39.6, platelet count 449. Chest x-ray negative for acute intrathoracic disease. Chronic  interstitial changes in the chest without any acute findings. CT of the head without contrast shows no evidence of acute intracranial  findings. CT of the neck and CT of the head has been done that showed no evidence  of acute intracranial findings. 50% stenosis of the proximal left  cervical internal carotid artery, otherwise no flow limiting stenosis or  branch occlusion delay detected within the head or neck. ASSESSMENT:  1. TIA. 2.  Possible stroke. 3.  Dysarthria. 4.  Uncontrolled hypertension, malignant range. 5.  Atherosclerotic heart disease. PLAN:  Get her admitted. MRI of the brain. PT/OT and speech eval.  The  patient is DNR comfort care.         Gene Vargas MD    D: 04/06/2019 21:35:17       T: 04/07/2019 0:26:54     [id number]  Job#: [id number] Doc#: [id number]    CC:

## 2019-04-07 NOTE — PROGRESS NOTES
Resting quietly - arouses easily but only briefly before returning to sleep; SBP <100; no other significant changes since earlier assessment, except as noted; no s/s of acute distress at present.

## 2019-04-08 NOTE — PROGRESS NOTES
Pt states she is ready to go, small BM and brief wet. Bernie care, brief changed and bed pad changed. No new skin issues noted. HS meds given and pt denies any further needs. Awaiting transport at this time.

## 2019-04-08 NOTE — PROGRESS NOTES
Speech Language Pathology    Discharge  Name: Mao Castillo  : 1930  Medical Diagnosis: TIA (transient ischemic attack) [G45.9]  TIA (transient ischemic attack) [G45.9]  Treatment Diagnosis: Dysphagia    Speech Therapy/Dysphagia  Pt discharged to [facility] on 2019. Bedside Swallow Eval completed 2019(see report). No goals met prior to discharge. Recommend: Continued Dysphagia treatment at [facility], with goals and treatment per Plan of Care. DIET LEVEL AT DISCHARGE:Regular diet, thin liquids, meds whole    GOALS ADDRESSED:  1.) Pt will tolerate recommended diet without s/s of aspiration (GOAL NOT MET)   2.) If clinical symptoms of penetration/aspiration are noted,Pt will tolerate MBS to r/o aspiration and determine appropriate diet/liquid level.   (GOAL NOT MET)        Juliana Pereyra, [address] [address] [id number]  Speech Language Pathologist

## 2019-05-09 NOTE — PROGRESS NOTES
Patient Active Problem List   Diagnosis    TIA (transient ischemic attack)    Dysarthria    Hypertensive urgency    ASHD (arteriosclerotic heart disease)     Patient seen , discharge dictated scripts given , arrangements made , LETICIA completed .  Discussed with nursing staff  And   If applicable ,  Discussed with  Patient's family , all questions answered and concerns addressed  When applicable

## 2019-05-10 NOTE — DISCHARGE SUMMARY
[facility] IV and tele removed from prior nurse. Pt left with clothing and LLE brace that she arrived with.
head, which shows no evidence of acute intracranial  finding. CT of the neck had 50% of stenosis of the proximal left  cervical internal carotid artery. The patient underwent neuro checks. MRI of the brain without contrast was done that showed no acute  infarction or acute intracranial process with diffuse cerebral volume  loss and mild chronic small vessel ischemia, remote right thalamic  nonhemorrhagic infarct. A transthoracic echocardiogram was also done  that shows no evidence of thromboembolic source. The patient had normal  size of the left ventricle with mild LVH, unable to determine LV  ejection fraction, mitral valve reveal regurgitation, mild pulmonic  regurgitation, and small pericardial effusion noted. After 3 days of  observation, the patient was given PT, OT, and speech evaluations. Necessary recommendations and dietary modifications were made. Brayden Brown made necessary arrangements for the patient to go to [facility]  after optimizing the antihypertensive management.         Trudi Mcdowell MD    D: 05/09/2019 9:50:40       T: 05/09/2019 10:30:35     [id number]  Job#: [id number]     Doc#: [id number]    CC: